# Patient Record
Sex: MALE | Race: WHITE
[De-identification: names, ages, dates, MRNs, and addresses within clinical notes are randomized per-mention and may not be internally consistent; named-entity substitution may affect disease eponyms.]

---

## 2018-07-08 NOTE — RAD
INDICATION:  Palpitations and dizziness.



COMPARISON:  Comparison is made with a prior study from February 07, 2008.



TECHNIQUE: A portable view of the chest was obtained.



FINDINGS: Cardiac and mediastinal contours appear to be within normal limits.



The lungs are clear. No pleural effusion is seen.



IMPRESSION:  NO EVIDENCE FOR ACUTE DISEASE.

## 2018-07-08 NOTE — RAD
INDICATION:  Dizziness.



COMPARISON:  Comparison is made with a prior CT of the brain from February 7, 2008.



TECHNIQUE: Contiguous axial sections of the brain were obtained from the skull base to the

vertex without contrast.



FINDINGS: The ventricles, cisterns and sulci are within normal limits.  No significant

focal abnormality or mass effect is seen. There is no evidence for hemorrhage.



No significant focal osseous abnormality is seen. The visualized portion of the paranasal

sinuses and mastoid air cells appear clear.



IMPRESSION:  NO EVIDENCE FOR ACUTE INTRACRANIAL ABNORMALITY.

## 2018-07-08 NOTE — ED
Addie ST Simon, scribed for Gurdeep Pollack MD on 07/08/18 at 1316 .





Hypertension





- HPI Summary


HPI Summary: 





This patient is a 37 year old M presenting to [facility] accompanied by his 

friend with a chief complaint of dizziness since 1230 today. Pt endorses 

dizziness, couldnt think straight, shakiness, palpitations, dehydration, and 

paresthesia (in waves) in his left arm and hand. Pt noted missing blood 

pressure medications for past 2 days. Pt states sx are resolved at this time. 

He denies CP and HA. PMHx no MI, CVA, DM, HLD. PMHx HTN.





- History of Current Complaint


Chief Complaint: EDDizziness


Stated Complaint: DIZZINESS,LT ARM TINGLING


Time Seen by Provider: 07/08/18 13:12


Hx Obtained From: Patient


Onset/Duration: Started Hours Ago - 1 hour, Resolved


Timing: Lasting Minutes


Aggravating Factor(s): Nothing


Alleviating Factor(s): Nothing


Associated Signs & Symptoms: Tingling, Dizziness, Other: - Shakes


Current Medications: Beta Blocker





- Allergies/Home Medications


Allergies/Adverse Reactions: 


 Allergies











Allergy/AdvReac Type Severity Reaction Status Date / Time


 


No Known Allergies Allergy   Verified 07/08/18 13:06











Home Medications: 


 Home Medications





Hydrochlorothiazide TAB* [Hydrodiuril TAB*] 25 mg PO DAILY 07/08/18 [History 

Confirmed 07/08/18]


Metoprolol Succinate XL TAB* [Toprol XL TAB*] 100 mg PO DAILY 07/08/18 [History 

Confirmed 07/08/18]











PMH/Surg Hx/FS Hx/Imm Hx


Endocrine/Hematology History: 


   Denies: Hx Diabetes


Cardiovascular History: Reports: Hx Hypertension


   Denies: Hx Hypercholesterolemia, Hx Myocardial Infarction


Sensory History: 


   Denies: Hx Legally Blind


Opthamlomology History: 


   Denies: Hx Legally Blind


EENT History: 


   Denies: Hx Deafness


Neurological History: 


   Denies: Hx CVA


Infectious Disease History: No


Infectious Disease History: 


   Denies: Traveled Outside the [place] in Last 30 Days





- Family History


Known Family History: Positive: Hypertension - Father, Diabetes - Father, Other 

- [place] grandmother





Review of Systems


Positive: Other - Dehydration


Positive: Palpitations.  Negative: Chest Pain


Neurological: Other - Dizziness


Positive: Paresthesia.  Negative: Headache


All Other Systems Reviewed And Are Negative: Yes





Physical Exam





- Summary


Physical Exam Summary: 





General: well-appearing, no pain distress


Skin: warm, color reflects adequate perfusion, dry


Head: normal


Eyes: EOMI, TANYA


ENT: normal


Neck: supple, nontender


Respiratory: CTA, breath sounds present


Cardiovascular: RRR


Abdomen: soft, nontender


Bowel: present


Musculoskeletal: normal, strength/ROM intact


Neurological: sensory/motor intact, A&O x3


Psychological: affect/mood appropriate


Triage Information Reviewed: Yes


Vital Signs On Initial Exam: 


 Initial Vitals











Temp Pulse Resp BP Pulse Ox


 


 97.9 F   102   15   179/97   100 


 


 07/08/18 13:02  07/08/18 13:02  07/08/18 13:02  07/08/18 13:02  07/08/18 13:02











Vital Signs Reviewed: Yes





Diagnostics





- Vital Signs


 Vital Signs











  Temp Pulse Resp BP Pulse Ox


 


 07/08/18 13:02  97.9 F  102  15  179/97  100














- Laboratory


Lab Results: 


 Lab Results











  07/08/18 07/08/18 07/08/18 Range/Units





  13:47 13:47 13:47 


 


WBC  6.4    (3.5-10.8)  10^3/ul


 


RBC  5.04    (4.00-5.40)  10^6/ul


 


Hgb  15.4    (14.0-18.0)  g/dl


 


Hct  44    (42-52)  %


 


MCV  87    (80-94)  fL


 


MCH  31    (27-31)  pg


 


MCHC  35    (31-36)  g/dl


 


RDW  15    (10.5-15)  %


 


Plt Count  175    (150-450)  10^3/ul


 


MPV  7.9    (7.4-10.4)  um3


 


Neut % (Auto)  79.8    (38-83)  %


 


Lymph % (Auto)  12.0 L    (25-47)  %


 


Mono % (Auto)  7.5 H    (0-7)  %


 


Eos % (Auto)  0.3    (0-6)  %


 


Baso % (Auto)  0.4    (0-2)  %


 


Absolute Neuts (auto)  5.1    (1.5-7.7)  10^3/ul


 


Absolute Lymphs (auto)  0.8 L    (1.0-4.8)  10^3/ul


 


Absolute Monos (auto)  0.5    (0-0.8)  10^3/ul


 


Absolute Eos (auto)  0    (0-0.6)  10^3/ul


 


Absolute Basos (auto)  0    (0-0.2)  10^3/ul


 


Absolute Nucleated RBC  0    10^3/ul


 


Nucleated RBC %  0.1    


 


INR (Anticoag Therapy)   0.98   (0.77-1.02)  


 


APTT   30.0   (26.0-36.3)  seconds


 


D-Dimer, Quantitative   < 200   (Less Than 230)  ng/mL


 


Sodium    137  (135-145)  mmol/L


 


Potassium    4.4  (3.5-5.0)  mmol/L


 


Chloride    101  (101-111)  mmol/L


 


Carbon Dioxide    28  (22-32)  mmol/L


 


Anion Gap    8  (2-11)  mmol/L


 


BUN    9  (6-24)  mg/dL


 


Creatinine    0.83  (0.67-1.17)  mg/dL


 


Est GFR ( Amer)    126.1  (>60)  


 


Est GFR (Non-Af Amer)    104.3  (>60)  


 


BUN/Creatinine Ratio    10.8  (8-20)  


 


Glucose    118 H  ()  mg/dL


 


Lactic Acid     (0.5-2.0)  mmol/L


 


Calcium    9.4  (8.6-10.3)  mg/dL


 


Magnesium    1.9  (1.9-2.7)  mg/dL


 


Total Bilirubin    0.80  (0.2-1.0)  mg/dL


 


AST    25  (13-39)  U/L


 


ALT    38  (7-52)  U/L


 


Alkaline Phosphatase    45  ()  U/L


 


Total Creatine Kinase    297 H  ()  U/L


 


CK-MB (CK-2)    7.2 H  (0.6-6.3)  ng/mL


 


Troponin I    0.00  (<0.04)  ng/mL


 


C-Reactive Protein    1.55  (<8.01)  mg/L


 


Total Protein    7.3  (6.4-8.9)  g/dL


 


Albumin    4.4  (3.2-5.2)  g/dL


 


Globulin    2.9  (2-4)  g/dL


 


Albumin/Globulin Ratio    1.5  (1-3)  


 


Lipase    32  (11.0-82.0)  U/L


 


TSH    0.74  (0.34-5.60)  mcIU/mL














  07/08/18 Range/Units





  13:47 


 


WBC   (3.5-10.8)  10^3/ul


 


RBC   (4.00-5.40)  10^6/ul


 


Hgb   (14.0-18.0)  g/dl


 


Hct   (42-52)  %


 


MCV   (80-94)  fL


 


MCH   (27-31)  pg


 


MCHC   (31-36)  g/dl


 


RDW   (10.5-15)  %


 


Plt Count   (150-450)  10^3/ul


 


MPV   (7.4-10.4)  um3


 


Neut % (Auto)   (38-83)  %


 


Lymph % (Auto)   (25-47)  %


 


Mono % (Auto)   (0-7)  %


 


Eos % (Auto)   (0-6)  %


 


Baso % (Auto)   (0-2)  %


 


Absolute Neuts (auto)   (1.5-7.7)  10^3/ul


 


Absolute Lymphs (auto)   (1.0-4.8)  10^3/ul


 


Absolute Monos (auto)   (0-0.8)  10^3/ul


 


Absolute Eos (auto)   (0-0.6)  10^3/ul


 


Absolute Basos (auto)   (0-0.2)  10^3/ul


 


Absolute Nucleated RBC   10^3/ul


 


Nucleated RBC %   


 


INR (Anticoag Therapy)   (0.77-1.02)  


 


APTT   (26.0-36.3)  seconds


 


D-Dimer, Quantitative   (Less Than 230)  ng/mL


 


Sodium   (135-145)  mmol/L


 


Potassium   (3.5-5.0)  mmol/L


 


Chloride   (101-111)  mmol/L


 


Carbon Dioxide   (22-32)  mmol/L


 


Anion Gap   (2-11)  mmol/L


 


BUN   (6-24)  mg/dL


 


Creatinine   (0.67-1.17)  mg/dL


 


Est GFR ( Amer)   (>60)  


 


Est GFR (Non-Af Amer)   (>60)  


 


BUN/Creatinine Ratio   (8-20)  


 


Glucose   ()  mg/dL


 


Lactic Acid  1.1  (0.5-2.0)  mmol/L


 


Calcium   (8.6-10.3)  mg/dL


 


Magnesium   (1.9-2.7)  mg/dL


 


Total Bilirubin   (0.2-1.0)  mg/dL


 


AST   (13-39)  U/L


 


ALT   (7-52)  U/L


 


Alkaline Phosphatase   ()  U/L


 


Total Creatine Kinase   ()  U/L


 


CK-MB (CK-2)   (0.6-6.3)  ng/mL


 


Troponin I   (<0.04)  ng/mL


 


C-Reactive Protein   (<8.01)  mg/L


 


Total Protein   (6.4-8.9)  g/dL


 


Albumin   (3.2-5.2)  g/dL


 


Globulin   (2-4)  g/dL


 


Albumin/Globulin Ratio   (1-3)  


 


Lipase   (11.0-82.0)  U/L


 


TSH   (0.34-5.60)  mcIU/mL











Result Diagrams: 


 07/08/18 13:47





 07/08/18 13:47


Lab Statement: Any lab studies that have been ordered have been reviewed, and 

results considered in the medical decision making process.





- Radiology


  ** CXR


Xray Interpretation: No Acute Changes


Radiology Interpretation Completed By: Radiologist - No evidence for acute 

disease. Dr. Pollack has reviewed this report.





- CT


  ** Brain


CT Interpretation: No Acute Changes


CT Interpretation Completed By: Radiologist - No evidence for acute intranial 

abnormality.





- EKG


  ** 1334


Cardiac Rate: NL - 89


EKG Rhythm: Sinus Rhythm


ST Segment: Normal


Ectopy: None





Re-Evaluation





- Re-Evaluation


  ** First Eval


Re-Evaluation Time: 14:58


Change: Improved


Comment: Discussed lab and imaging results, as well as what pt wants for plan 

of care moving forward. Pt declined trop level recheck. Pt feels fine, denies 

any sx. Discussed D/C.





Hypertension Course/Dx





- Course


Course Of Treatment: PATIENT HAD BEEN WITHOUT HIS BP MEDS FOR 2 FDAYS; THESE 

WERE GIVEN IN THE ED. PATIENT FEELS WELL IN ED. NO NEUROLOGIC DEFICIT. DID NOT 

HAVE CHEST PAIN. DISCUSSED RESULTS WITH THE PATIENT TO INCLUDE REPEATING A 

TROPONIN. THE PATIENT DECLINES A REPEAT TROPONIN.  WILL F/U WITH PMD; RETURN TO 

ED IF WORSE.





- Diagnoses


Provider Diagnoses: 


 Palpitations, Dizziness, Paresthesia of left arm, Hypertension








Discharge





- Sign-Out/Discharge


Documenting (check all that apply): Discharge/Admit/Transfer





- Discharge Plan


Condition: Stable


Disposition: HOME


Patient Education Materials:  Heart Palpitations (ED), Paresthesia (ED), 

Hypertension (ED), Dizziness (ED)


Referrals: 


Fam RODRIGUEZ,Natalie G. [Medical Doctor] - 


Additional Instructions: 


FOLLOW UP WITH YOUR DOCTOR.


RETURN TO THE EMERGENCY DEPARTMENT FOR ANY WORSENING OF YOUR CONDITION; CHEST 

PAIN, SHORTNESS OF BREATH, WEAKNESS, NUMBNESS, DIFFICULTY WITH SPEECH OR VISION 

OR QUESTIONS OR CONCERNS.





- Billing Disposition and Condition


Condition: STABLE


Disposition: Home





The documentation as recorded by the Addie villalta Simon accurately 

reflects the service I personally performed and the decisions made by me, 

Gurdeep Pollack MD.

## 2019-11-25 ENCOUNTER — HOSPITAL ENCOUNTER (EMERGENCY)
Dept: HOSPITAL 25 - UCEAST | Age: 39
Discharge: HOME | End: 2019-11-25
Payer: COMMERCIAL

## 2019-11-25 VITALS — DIASTOLIC BLOOD PRESSURE: 84 MMHG | SYSTOLIC BLOOD PRESSURE: 149 MMHG

## 2019-11-25 DIAGNOSIS — Z76.0: ICD-10-CM

## 2019-11-25 DIAGNOSIS — I10: Primary | ICD-10-CM

## 2019-11-25 PROCEDURE — 99211 OFF/OP EST MAY X REQ PHY/QHP: CPT

## 2019-11-25 PROCEDURE — G0463 HOSPITAL OUTPT CLINIC VISIT: HCPCS

## 2019-11-25 NOTE — UC
General HPI





- HPI Summary


HPI Summary: 





39 year old male with PMH + for HTN, dx'd ~ 10 years ago, on metoprolol/ HCTZ x 

~ 10 years.  no side effects from medication.   REcently moved to area, has not 

established PCP.   Last saw PCP in [place], [place] over a year ago, refused to 

fill medications.   Patient denies lightheadedness, HA, palpiations, faitgue, 

SOB, pain.  NO complaints.   TOok medication this AM, but no longer has refills

/ med.  requesting refill.    Patient brought in medication bottles for 

verificaion, prior PCP Dr. Small. 








- History of Current Complaint


Chief Complaint: UCMedRefill


Stated Complaint: medicatION REFILL


Time Seen by Provider: 11/25/19 17:48


Hx Obtained From: Patient


Onset/Duration: Other


Current Severity: None


Pain Intensity: 0


Associated Signs & Symptoms: Negative: Agitation, Cough, Chest Pain, Decreased 

Responsiveness, Dizziness, Decreased Oral Intake, Diaphoresis, Edema, Fever, 

Headache, Hematemesis, Palpitations, Recent Medication Changes, Syncope, SOB, 

Vomiting, Wheezing, Weakness





- Allergy/Home Medications


Allergies/Adverse Reactions: 


 Allergies











Allergy/AdvReac Type Severity Reaction Status Date / Time


 


No Known Allergies Allergy   Verified 07/08/18 13:06














PMH/Surg Hx/FS Hx/Imm Hx


Previously Healthy: Yes - htn





- Surgical History


Surgical History: None





- Family History


Known Family History: Positive: Hypertension - Father, Diabetes - Father, Other 

- [place] grandmother, Non-Contributory





- Social History


Occupation: Employed Full-time


Alcohol Use: Occasionally


Alcohol Amount: beer


Substance Use Type: None


Smoking Status (MU): Never Smoked Tobacco





Review of Systems


All Other Systems Reviewed And Are Negative: Yes


Constitutional: Positive: Negative


Respiratory: Positive: Negative


Cardiovascular: Positive: Negative


Gastrointestinal: Positive: Negative


Motor: Positive: Negative


Musculoskeletal: Positive: Negative.  Negative: Arthralgia


Is Patient Immunocompromised?: No





Physical Exam


Triage Information Reviewed: Yes


Appearance: Well-Appearing, No Pain Distress, Well-Nourished


Vital Signs: 


 Initial Vital Signs











Temp  97.7 F   11/25/19 17:42


 


Pulse  72   11/25/19 17:42


 


Resp  16   11/25/19 17:42


 


BP  149/84   11/25/19 17:42


 


Pulse Ox  99   11/25/19 17:42











Vital Signs Reviewed: Yes


Eyes: Positive: Conjunctiva Clear


ENT: Positive: Hearing grossly normal


Respiratory: Positive: Chest non-tender, Lungs clear, Normal breath sounds, No 

respiratory distress, No accessory muscle use.  Negative: Respiratory distress, 

Crackles, Rhonchi, Stridor, Wheezing


Cardiovascular: Positive: RRR, No Murmur, Pulses Normal


Neurological Exam: Normal


Psychological Exam: Normal


Skin Exam: Normal





Course/Dx





- Course


Course Of Treatment: 


HTN, stable.  Med refill. 





- Blood pressure OK today 


- Please call [facility] to obtain a primary physician or follow [facility] with 

[facility] if you do not have insurance for medications 


- Go to ER with increased blood pressure, headache, lightheadedness, increased 

heart rate/ anxiety, chest pain, shortness of breath 








- Differential Dx - Multi-Symptom


Differential Diagnoses: Other





- Diagnoses


Provider Diagnosis: 


 HTN (hypertension)








Discharge ED





- Sign-Out/Discharge


Documenting (check all that apply): Patient Departure


All imaging exams completed and their final reports reviewed: No Studies





- Discharge Plan


Condition: Good


Disposition: HOME


Prescriptions: 


Hydrochlorothiazide TAB* [Hydrodiuril TAB*] 25 mg PO DAILY #30 tab


Metoprolol Succinate XL TAB* [Toprol XL TAB*] 100 mg PO DAILY #30 tab.xl


Patient Education Materials:  Hypertension (ED)


Referrals: 


No Primary Care Phys,NOPCP [Primary Care Provider] - 


[facility] [Outside]


[facility] [Provider Group]


Additional Instructions: 


- Blood pressure OK today 


- Please call [facility] to obtain a primary physician or follow up with 

the [facility] clinic if you do not have insurance for medications 


- Go to ER with increased blood pressure, headache, lightheadedness, increased 

heart rate/ anxiety, chest pain, shortness of breath 








- Billing Disposition and Condition


Condition: GOOD


Disposition: Home





- Attestation Statements


Provider Attestation: 





Per institutional requirements, I have reviewed the chart, however, I was not 

consulted specifically or made aware of this patient by the  midlevel provider.

  I did not personally evaluate, interact with , or disposition  this patient.

## 2019-12-26 ENCOUNTER — HOSPITAL ENCOUNTER (EMERGENCY)
Dept: HOSPITAL 25 - UCEAST | Age: 39
Discharge: HOME | End: 2019-12-26
Payer: COMMERCIAL

## 2019-12-26 VITALS — DIASTOLIC BLOOD PRESSURE: 90 MMHG | SYSTOLIC BLOOD PRESSURE: 143 MMHG

## 2019-12-26 DIAGNOSIS — Z76.0: ICD-10-CM

## 2019-12-26 DIAGNOSIS — I10: Primary | ICD-10-CM

## 2019-12-26 PROCEDURE — 99212 OFFICE O/P EST SF 10 MIN: CPT

## 2019-12-26 PROCEDURE — G0463 HOSPITAL OUTPT CLINIC VISIT: HCPCS

## 2019-12-26 NOTE — UC
Hypertension HPI





- HPI Summary


HPI Summary: 





40 yo male presents requesting med refill. He tells me that he has been on 

metoprolol and HCTZ for 10+ years and recently moved back to the area. He came 

here about a month ago requesting refills of these same medications and they 

were refilled for 30 days with instructions to f/u with PCP. He was given info 

to the referral line, but admits he never called to schedule because "life got 

busy". He ran out of medication yesterday. Has been feeling well and denies 

headaches, dizziness, SOB, chest pain, n/v, numbness, tingling, RODRÍGUEZ. 





- History of Current Complaint


Stated Complaint: MED REFILL


Time Seen by Provider: 12/26/19 15:21


Hx Obtained From: Patient





- Allergies/Home Medications


Allergies/Adverse Reactions: 


 Allergies











Allergy/AdvReac Type Severity Reaction Status Date / Time


 


No Known Allergies Allergy   Verified 12/26/19 15:27














PMH/Surg Hx/FS Hx/Imm Hx


Cardiovascular History: Hypertension





- Surgical History


Surgical History: None





- Family History


Known Family History: Positive: Hypertension - Father, Diabetes - Father, Other 

- [place] grandmother, Non-Contributory





- Social History


Occupation: Employed Full-time


Lives: With Family


Alcohol Use: Occasionally


Alcohol Amount: beer


Substance Use Type: None


Smoking Status (MU): Never Smoked Tobacco





Review of Systems


All Other Systems Reviewed And Are Negative: No


Constitutional: Positive: Negative


Skin: Positive: Negative


Eyes: Positive: Negative


ENT: Positive: Negative


Respiratory: Positive: Negative


Cardiovascular: Positive: Negative


Gastrointestinal: Positive: Negative


Neurovascular: Positive: Negative


Neurological: Positive: Negative


Psychological: Positive: Negative





Physical Exam





- Summary


Physical Exam Summary: 





 


GENERAL: NAD. WDWN. No pain distress.


SKIN: No rashes, sores, or open wounds.


NECK: Supple. Nontender. No lymphadenopathy. 


CHEST:  CTAB. No r/r/w. No accessory muscle use. Breathing comfortably and in 

no distress.


CV:  RRR. Pulses intact. Brisk cap refill.


MSK: Moves all extremities. 


NEURO: Alert. 


PSYCH: Age appropriate behavior.


Triage Information Reviewed: Yes


Vital Signs: 





Vital Signs:











Temp Pulse Resp BP Pulse Ox


 


 98.6 F   73   16   143/90   99 


 


 12/26/19 15:22  12/26/19 15:22  12/26/19 15:22  12/26/19 15:22  12/26/19 15:22











Vital Signs Reviewed: Yes





Hypertension Course/Dx





- Course


Course Of Treatment: 





Acceptable BP control exhibited here today. 


I called July Smith at the [facility] referral line and she did not answer - I left a 

message for her to contact pt to set up a PCP appt.


Discussed with pt and will refill his medications for 30 days and instructed 

him to contact July Smith if he has not heard anything from her within the next 3-

5 business days. 





- Differential Dx/Diagnosis


Provider Diagnosis: 


 HTN (hypertension)








Discharge ED





- Sign-Out/Discharge


Documenting (check all that apply): Patient Departure


All imaging exams completed and their final reports reviewed: No Studies





- Discharge Plan


Condition: Stable


Disposition: HOME


Prescriptions: 


Hydrochlorothiazide TAB* [Hydrodiuril TAB*] 25 mg PO DAILY #30 tab


Metoprolol Succinate XL TAB* [Toprol XL TAB*] 100 mg PO DAILY #30 tab.xl


Patient Education Materials:  Chronic Hypertension (ED)


Referrals: 


No Primary Care Phys,NOPCP [Primary Care Provider] - 


Additional Instructions: 


If you develop a fever, shortness of breath, chest pain, new or worsening 

symptoms - please call your PCP or go to the ED immediately.


 


July Smith will call you within a few days to schedule you an appointment with a 

Primary Doctor based on your location and schedule


-- It is very important that you schedule an appointment with a PCP within 30 

days





- Billing Disposition and Condition


Condition: STABLE


Disposition: Home





- Attestation Statements


Provider Attestation: 





I was available for consult. This patient was seen by the JULIO. The patient was 

not presented to, seen by, or examined by me. -Sammy